# Patient Record
Sex: FEMALE | URBAN - METROPOLITAN AREA
[De-identification: names, ages, dates, MRNs, and addresses within clinical notes are randomized per-mention and may not be internally consistent; named-entity substitution may affect disease eponyms.]

---
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
## 2021-07-14 ENCOUNTER — OFFICE VISIT (OUTPATIENT)
Dept: FAMILY MEDICINE | Facility: CLINIC | Age: 21
End: 2021-07-14
Attending: FAMILY MEDICINE
Payer: COMMERCIAL

## 2021-07-14 ENCOUNTER — ALLIED HEALTH/NURSE VISIT (OUTPATIENT)
Dept: OBGYN | Facility: CLINIC | Age: 21
End: 2021-07-14
Attending: OBSTETRICS & GYNECOLOGY
Payer: COMMERCIAL

## 2021-07-14 VITALS
SYSTOLIC BLOOD PRESSURE: 102 MMHG | HEIGHT: 62 IN | HEART RATE: 84 BPM | WEIGHT: 111 LBS | BODY MASS INDEX: 20.43 KG/M2 | DIASTOLIC BLOOD PRESSURE: 68 MMHG

## 2021-07-14 DIAGNOSIS — Z30.432 ENCOUNTER FOR IUD REMOVAL: Primary | ICD-10-CM

## 2021-07-14 DIAGNOSIS — N94.6 DYSMENORRHEA: ICD-10-CM

## 2021-07-14 DIAGNOSIS — Z30.09 ENCOUNTER FOR COUNSELING REGARDING CONTRACEPTION: ICD-10-CM

## 2021-07-14 PROCEDURE — G0463 HOSPITAL OUTPT CLINIC VISIT: HCPCS | Mod: 25

## 2021-07-14 PROCEDURE — 58301 REMOVE INTRAUTERINE DEVICE: CPT

## 2021-07-14 PROCEDURE — G0463 HOSPITAL OUTPT CLINIC VISIT: HCPCS

## 2021-07-14 PROCEDURE — 99204 OFFICE O/P NEW MOD 45 MIN: CPT | Performed by: FAMILY MEDICINE

## 2021-07-14 RX ORDER — ONDANSETRON 4 MG/1
4 TABLET, FILM COATED ORAL EVERY 8 HOURS PRN
Qty: 30 TABLET | Refills: 0 | Status: SHIPPED | OUTPATIENT
Start: 2021-07-14

## 2021-07-14 RX ORDER — COPPER 313.4 MG/1
1 INTRAUTERINE DEVICE INTRAUTERINE ONCE
COMMUNITY
End: 2021-07-14

## 2021-07-14 ASSESSMENT — PATIENT HEALTH QUESTIONNAIRE - PHQ9
5. POOR APPETITE OR OVEREATING: NOT AT ALL
SUM OF ALL RESPONSES TO PHQ QUESTIONS 1-9: 6

## 2021-07-14 ASSESSMENT — ANXIETY QUESTIONNAIRES
3. WORRYING TOO MUCH ABOUT DIFFERENT THINGS: MORE THAN HALF THE DAYS
6. BECOMING EASILY ANNOYED OR IRRITABLE: NOT AT ALL
1. FEELING NERVOUS, ANXIOUS, OR ON EDGE: MORE THAN HALF THE DAYS
5. BEING SO RESTLESS THAT IT IS HARD TO SIT STILL: NOT AT ALL
2. NOT BEING ABLE TO STOP OR CONTROL WORRYING: NOT AT ALL
7. FEELING AFRAID AS IF SOMETHING AWFUL MIGHT HAPPEN: NOT AT ALL
GAD7 TOTAL SCORE: 4

## 2021-07-14 ASSESSMENT — MIFFLIN-ST. JEOR: SCORE: 1216.74

## 2021-07-14 ASSESSMENT — PAIN SCALES - GENERAL: PAINLEVEL: NO PAIN (0)

## 2021-07-14 NOTE — NURSING NOTE
Chief Complaint   Patient presents with     Establish Care     C/O severe cramping and nausea   Ghislaine Schilling LPN

## 2021-07-14 NOTE — LETTER
Date:August 14, 2021      Patient was self referred, no letter generated. Do not send.        [facility] Information

## 2021-07-14 NOTE — PROGRESS NOTES
OB/Gyn Clinic Visit    IUD Removal Procedure:  The patient was placed in the dorsal lithotomy position. A speculum was inserted in the vagina, and the cervix visualized. IUD strings were not visualized. US confirmed that the IUD was at the fundus of the uterus. An Alligator clamp was introduced into the cervix, grasped the strings and the IUD removed in its entirety.    Patient was seen with Dr. Guerra.    Erika Shine MD  Ob/Gyn Resident, PGY-2  07/14/2021 3:06 PM

## 2021-07-14 NOTE — LETTER
7/14/2021       RE: Rafi Rock  [address]  [facility] [address]     Dear Colleague,    Thank you for referring your patient, Rafi Rock, to the [facility]. Please see a copy of my visit note below.        Assessment & Plan   Problem List Items Addressed This Visit     None      Visit Diagnoses     Encounter for IUD removal    -  Primary    Relevant Orders    Ultrasound-guided IUD Removal - 70587 (In Clinic)    Dysmenorrhea        Relevant Medications    ondansetron (ZOFRAN) 4 MG tablet    Other Relevant Orders    Ob/Gyn Referral    Encounter for counseling regarding contraception                 53 minutes spent on the date of the encounter doing chart review, review of outside records, patient visit and documentation            No follow-ups on file.    Tirso Sams MD  [facility]    Gabe Mckee is a 21 year old who presents for the following health issues     HPI   Pt. Here for IUD removal    Had Kyleena IUD placed in  11/13/2020. She states had significant pain with menses, and was told IUD would help with this, but has not--would like removal.  Menarche at age 11, unsure when dysmenorrhea started, around age 16-18  Symptoms: severe cramping, nausea, vomiting starting just prior to menses--no diarhhea,   Difficult to eat or drink, has needed IV fluids  Occurs with every menses, lasts about 1 week, but worse at start of menses  Moderate flow    Trials of prior intervention:  Depo Provera: depression, lost weight, nausea  States unable to swallow OCPs  Continues to have severe pain with IUD, nausea, emesis  Zofran helpful     Was seen at [facility] 3/30/2021 for STI screening, discussed IUD removal then but did not return.             Current Outpatient Medications   Medication     ondansetron (ZOFRAN) 4 MG tablet     No current facility-administered medications for  "this visit.     There is no problem list on file for this patient.      Review of Systems   As per HIP      Objective    /68   Pulse 84   Ht 1.575 m (5' 2\")   Wt 50.3 kg (111 lb)   LMP 07/10/2021   Breastfeeding No   BMI 20.30 kg/m    Body mass index is 20.3 kg/m .  Physical Exam   Alert, anxious    Pelvic Exam:  EG/BUS: Normal genital architecture without lesions, erythema or abnormal secretions Bartholin's, Urethra, [place]'s normal   Urethral meatus: normal   Vagina: moist, pink, rugae with creamy, white and odorless  secretions  Cervix: no lesions, IUD strings not visualized    Dr. Guerra (ob/gyn) consulted, who successfullly supervised student/resident with ultrasound guided IUD removal     A/P  1. Dysmenorrhea  2. Contraception counseling  Refer to ob/gyne for further evaluation of dysmenorrhea  Consider ibuprofen 600 tid or OTC naproxen 2 tab bid during menses  Refill zofran to use during menses    Counseled patient regarding non oral contraception options, including ortha evra and Nuvaring  Recommend avoid intercourse use condoms/barrier methods for STI protection and contraception  Follow-up with ob/gyne              Again, thank you for allowing me to participate in the care of your patient.      Sincerely,    Tirso Sams MD      "

## 2021-07-14 NOTE — PROGRESS NOTES
"    Assessment & Plan   Problem List Items Addressed This Visit     None      Visit Diagnoses     Encounter for IUD removal    -  Primary    Relevant Orders    Ultrasound-guided IUD Removal - 93122 (In Clinic)    Dysmenorrhea        Relevant Medications    ondansetron (ZOFRAN) 4 MG tablet    Other Relevant Orders    Ob/Gyn Referral    Encounter for counseling regarding contraception                 53 minutes spent on the date of the encounter doing chart review, review of outside records, patient visit and documentation            No follow-ups on file.    Tirso Sams MD  [facility]   [facility] 21 [facility] who presents for the following health issues     HPI   Pt. Here for IUD removal    Had Kyleena IUD placed in  11/13/2020. She states had significant pain with menses, and was told IUD would help with this, but has not--would like removal.  Menarche at age 11, unsure when dysmenorrhea started, around age 16-18  Symptoms: severe cramping, nausea, vomiting starting just prior to menses--no diarhhea,   Difficult to eat or drink, has needed IV fluids  Occurs with every menses, lasts about 1 week, but worse at start of menses  Moderate flow    Trials of prior intervention:  Depo Provera: depression, lost weight, nausea  States unable to swallow OCPs  Continues to have severe pain with IUD, nausea, emesis  Zofran helpful     Was seen at [facility] 3/30/2021 for STI screening, discussed IUD removal then but did not return.             Current Outpatient Medications   Medication     ondansetron (ZOFRAN) 4 MG tablet     No current facility-administered medications for this visit.     There is no problem list on file for this patient.      Review of Systems   As per HIP      Objective    /68   Pulse 84   Ht 1.575 m (5' 2\")   Wt 50.3 kg (111 lb)   LMP 07/10/2021   Breastfeeding No   BMI 20.30 kg/m    Body mass index is 20.3 kg/m .  Physical Exam   Alert, " anxious    Pelvic Exam:  EG/BUS: Normal genital architecture without lesions, erythema or abnormal secretions Bartholin's, Urethra, [place]'s normal   Urethral meatus: normal   Vagina: moist, pink, rugae with creamy, white and odorless  secretions  Cervix: no lesions, IUD strings not visualized    Dr. Guerra (ob/gyn) consulted, who successfullly supervised student/resident with ultrasound guided IUD removal     A/P  1. Dysmenorrhea  2. Contraception counseling  Refer to ob/gyne for further evaluation of dysmenorrhea  Consider ibuprofen 600 tid or OTC naproxen 2 tab bid during menses  Refill zofran to use during menses    Counseled patient regarding non oral contraception options, including ortha evra and Nuvaring  Recommend avoid intercourse use condoms/barrier methods for STI protection and contraception  Follow-up with ob/gyne

## 2021-07-15 ASSESSMENT — ANXIETY QUESTIONNAIRES: GAD7 TOTAL SCORE: 4

## 2025-09-04 ENCOUNTER — NURSE TRIAGE (OUTPATIENT)
Dept: FAMILY MEDICINE | Facility: CLINIC | Age: 25
End: 2025-09-04